# Patient Record
Sex: MALE | Race: WHITE | Employment: OTHER | URBAN - METROPOLITAN AREA
[De-identification: names, ages, dates, MRNs, and addresses within clinical notes are randomized per-mention and may not be internally consistent; named-entity substitution may affect disease eponyms.]

---

## 2018-03-25 ENCOUNTER — HOSPITAL ENCOUNTER (EMERGENCY)
Facility: CLINIC | Age: 83
Discharge: HOME | End: 2018-03-25
Attending: EMERGENCY MEDICINE | Admitting: EMERGENCY MEDICINE
Payer: COMMERCIAL

## 2018-03-25 VITALS
HEART RATE: 60 BPM | TEMPERATURE: 97.9 F | WEIGHT: 183 LBS | BODY MASS INDEX: 25.62 KG/M2 | OXYGEN SATURATION: 100 % | SYSTOLIC BLOOD PRESSURE: 158 MMHG | DIASTOLIC BLOOD PRESSURE: 82 MMHG | HEIGHT: 71 IN

## 2018-03-25 DIAGNOSIS — L02.212 ABSCESS OF BACK: ICD-10-CM

## 2018-03-25 DIAGNOSIS — L03.312 CELLULITIS OF BACK: ICD-10-CM

## 2018-03-25 PROCEDURE — 10060 I&D ABSCESS SIMPLE/SINGLE: CPT

## 2018-03-25 PROCEDURE — 87070 CULTURE OTHR SPECIMN AEROBIC: CPT | Performed by: EMERGENCY MEDICINE

## 2018-03-25 PROCEDURE — 87076 CULTURE ANAEROBE IDENT EACH: CPT | Performed by: EMERGENCY MEDICINE

## 2018-03-25 PROCEDURE — 99283 EMERGENCY DEPT VISIT LOW MDM: CPT | Mod: 25

## 2018-03-25 PROCEDURE — 87077 CULTURE AEROBIC IDENTIFY: CPT | Performed by: EMERGENCY MEDICINE

## 2018-03-25 RX ORDER — SULFAMETHOXAZOLE AND TRIMETHOPRIM 400; 80 MG/1; MG/1
1 TABLET ORAL 2 TIMES DAILY
Qty: 14 TABLET | Refills: 0 | Status: SHIPPED | OUTPATIENT
Start: 2018-03-25 | End: 2018-04-01

## 2018-03-25 RX ORDER — CEPHALEXIN 500 MG/1
500 CAPSULE ORAL 3 TIMES DAILY
Qty: 21 CAPSULE | Refills: 0 | Status: SHIPPED | OUTPATIENT
Start: 2018-03-25 | End: 2018-04-01

## 2018-03-25 ASSESSMENT — ENCOUNTER SYMPTOMS
CHILLS: 0
WOUND: 1
FEVER: 0

## 2018-03-25 NOTE — ED PROVIDER NOTES
"  History     Chief Complaint:  Wound Check     HPI   Joaquim Martinez is an anticoagulated 83 year old male, with a history of atrial fibrillation, who presents with wound check. The patient states that he has a carbuncle on his back, between his shoulders for decades that he notes several providers have evaluated and has not had any difficulties with it in the past. Recently, the patient states that the carbuncle began to hurt and approximately 5 days ago started to drain. He had waited to see if it would resolve on its own, but notes that it began to increase in pain, prompting his ED visit. Here, the patient denies any other symptoms including fever, shaking, or chills. He is not diabetic.     The patient's past medical history and medications were reviewed in CareEverywhere.     Allergies:  Calcium channel blockers    Medications:    Vitamin D  Coumadin  Cozaar  Ambien   Coreg  Lasix  Lupron  Potassium Chloride    Past Medical History:    Cancer  Hypertension  Atrial fibrillation   Cardiomyopathy  PVD  Pacemaker  Carotid bruit  PAD  Hyperlipidemia    Past Surgical History:    The patient does not have any pertinent past surgical history  Family History:    No past pertinent family history.    Social History:  Negative for alcohol use.  Negative for tobacco use.   Marital Status:   [2]     Review of Systems   Constitutional: Negative for chills and fever.   Skin: Positive for wound.   All other systems reviewed and are negative.      Physical Exam   First Vitals:  BP: 158/82  Pulse: 60  Heart Rate: 60  Temp: 97.9  F (36.6  C)  Height: 180.3 cm (5' 11\")  Weight: 83 kg (183 lb)  SpO2: 100 %    Physical Exam  General: Alert and cooperative.   Resp:  Non-labored  Skin:  Midline upper thoracic back w/tender fluctuant area w/surrounding erythema.  Surface appears abraded and when pressure applied, drains purulent bloody material from multiple locations.  Neuro:  Speech is normal and fluent.   MSkel:  Moving all " extremities      Emergency Department Course     Laboratory:  Abscess culture aerobic bacterial: In process     Procedures:    Procedure: Incision and Drainage   LOCATIONS:  Back      ANESTHESIA:  Local field block using Lidocaine 1% with epinephrine     PREPARATION:  Cleansed with Normal Saline and Shur Clens     PROCEDURE:  Area was incised with # 11 Blade (Sharp Point) with a Single Straight incision.  Wound treatment included Purulent Drainage, deloculation, expression of copious material: initially purulent and then caseous.  Packing consisted of wick packing.  Appropriate dressing was applied to cover the area.    Patient Status:  Patient tolerated the procedure well. There were no complications.    Emergency Department Course:  Nursing notes and vitals reviewed.     0809  I performed an exam of the patient as documented above. I had also performed the incision and drainage as noted above. The patient tolerated the procedure well. A sample from the area was obtained and sent to lab for testing.     Findings and plan explained to the Patient. Patient discharged home with instructions regarding supportive care, medications, and reasons to return. The importance of close follow-up was reviewed. The patient was prescribed Keflex and Bactrim.         Impression & Plan      Medical Decision Making:  Joaquim Martinez is a 83 year old male who presents for evaluation of pain and drainage at a site of a carbuncle. On exam, this is clearly infected with purulent drainage and redness surrounding it c/w abscess and cellulitis. I and D was performed with expression of both pus and likely sebaceous material. Based on the extent of drainage, wick was placed. He will be placed on antibiotics for strep and staph coverage and will need close follow up with his PCP in 2 days time to reevaluate the wound.  Given extent of drainage, culture was sent.  Bactrim discussed with pharmacy due to patient being on coumadin but is the most  appropriate choice for coverage of staph.    Diagnosis:    ICD-10-CM   1. Abscess of back L02.212   2. Cellulitis of back L03.312       Disposition:  discharged to home    Discharge Medications:  New Prescriptions    CEPHALEXIN (KEFLEX) 500 MG CAPSULE    Take 1 capsule (500 mg) by mouth 3 times daily for 7 days    SULFAMETHOXAZOLE-TRIMETHOPRIM (BACTRIM) 400-80 MG PER TABLET    Take 1 tablet by mouth 2 times daily for 7 days     Malika MANUEL am serving as a scribe on 3/25/2018 at 8:09 AM to personally document services performed by Theresa Mireles MD based on my observations and the provider's statements to me.      Malika Beckham  3/25/2018    EMERGENCY DEPARTMENT                           Theresa Mireles MD  03/25/18 4957

## 2018-03-25 NOTE — ED AVS SNAPSHOT
Emergency Department    [address] [facility] [address]    Phone:  [phone]    Fax:  [phone]                                       Joaquim Martinez   MRN: [id number]    Department:   Emergency Department   Date of Visit:  3/25/2018           After Visit Summary Signature Page     I have received my discharge instructions, and my questions have been answered. I have discussed any challenges I see with this plan with the nurse or doctor.    ..........................................................................................................................................  Patient/Patient Representative Signature      ..........................................................................................................................................  Patient Representative Print Name and Relationship to Patient    ..................................................               ................................................  Date                                            Time    ..........................................................................................................................................  Reviewed by Signature/Title    ...................................................              ..............................................  Date                                                            Time

## 2018-03-25 NOTE — ED AVS SNAPSHOT
Emergency Department    [address] [facility] [address]    Phone:  [phone]    Fax:  [phone]                                       Joaquim Martinez   MRN: [id number]    Department:   Emergency Department   Date of Visit:  3/25/2018           Patient Information     Date Of Birth          10/6/1934        Your diagnoses for this visit were:     Abscess of back     Cellulitis of back        You were seen by Theresa Mireles MD.      Follow-up Information     Follow up with In clinic.    Why:  2 days to reassess wound and remove packing        Follow up with  Emergency Department.    Specialty:  EMERGENCY MEDICINE    Why:  As needed, If symptoms worsen    Contact information:    [address] [facility] [address] [phone]        Discharge Instructions         Abscess (Incision & Drainage)  An abscess is sometimes called a boil. It happens when bacteria get trapped under the skin and start to grow. Pus forms inside the abscess as the body responds to the bacteria. An abscess can happen with an insect bite, ingrown hair, blocked oil gland, pimple, cyst, or puncture wound.  Your healthcare provider has drained the pus from your abscess. If the abscess pocket was large, your healthcare provider may have put in gauze packing. Your provider will need to remove it on your next visit. He or she may also replace it at that time. You may not need antibiotics to treat a simple abscess, unless the infection is spreading into the skin around the wound (cellulitis).  The wound will take about 1 to 2 weeks to heal, depending on the size of the abscess. Healthy tissue will grow from the bottom and sides of the opening until it seals over.  Home care  These tips can help your wound heal:    The wound may drain for the first 2 days. Cover the wound with a clean dry dressing. Change the dressing if it becomes soaked with blood or pus.    If a gauze packing was placed inside  the abscess pocket, you may be told to remove it yourself. You may do this in the shower. Once the packing is removed, you should wash the area in the shower, or clean the area as directed by your provider. Continue to do this until the skin opening has closed. Make sure you wash your hands after changing the packing or cleaning the wound.    If you were prescribed antibiotics, take them as directed until they are all gone.    You may use acetaminophen or ibuprofen to control pain, unless another pain medicine was prescribed. If you have liver disease or ever had a stomach ulcer, talk with your doctor before using these medicines.  Follow-up care  Follow up with your healthcare provider, or as advised. If a gauze packing was put in your wound, it should be removed in 1 to 2 days. Check your wound every day for any signs that the infection is getting worse. The signs are listed below.  When to seek medical advice  Call your healthcare provider right away if any of these occur:    Increasing redness or swelling    Red streaks in the skin leading away from the wound    Increasing local pain or swelling    Continued pus draining from the wound 2 days after treatment    Fever of 100.4 F (38 C) or higher, or as directed by your healthcare provider    Boil returns when you are at home  Date Last Reviewed: 9/1/2016 2000-2017 [employer]. [address] [place] [address], [place] [address]. All rights reserved. This information is not intended as a substitute for professional medical care. Always follow your healthcare professional's instructions.          24 Hour Appointment Hotline       To make an appointment at any [facility], call [phone] ([phone]). If you don't have a family doctor or clinic, we will help you find one. [facility] are conveniently located to serve the needs of you and your family.             Review of your medicines      START taking        Dose / Directions Last dose  taken    cephALEXin 500 MG capsule   Commonly known as:  KEFLEX   Dose:  500 mg   Quantity:  21 capsule        Take 1 capsule (500 mg) by mouth 3 times daily for 7 days   Refills:  0        sulfamethoxazole-trimethoprim 400-80 MG per tablet   Commonly known as:  BACTRIM   Dose:  1 tablet   Quantity:  14 tablet        Take 1 tablet by mouth 2 times daily for 7 days   Refills:  0                Prescriptions were sent or printed at these locations (2 Prescriptions)                   [id number] - [place], [place] - [address] [facility] [address] [facility]   [address] [facility] [address]    Telephone:  [phone]   Fax:  [phone]   Hours:                  E-Prescribed (2 of 2)         cephALEXin (KEFLEX) 500 MG capsule               sulfamethoxazole-trimethoprim (BACTRIM) 400-80 MG per tablet                Procedures and tests performed during your visit     Abscess Culture Aerobic Bacterial      Orders Needing Specimen Collection     None      Pending Results     Date and Time Order Name Status Description    3/25/2018 0820 Abscess Culture Aerobic Bacterial In process             Pending Culture Results     Date and Time Order Name Status Description    3/25/2018 0820 Abscess Culture Aerobic Bacterial In process             Pending Results Instructions     If you had any lab results that were not finalized at the time of your Discharge, you can call the ED Lab Result RN at [phone]. You will be contacted by this team for any positive Lab results or changes in treatment. The nurses are available 7 days a week from 10A to 6:30P.  You can leave a message 24 hours per day and they will return your call.        Test Results From Your Hospital Stay        3/25/2018  8:25 AM                Clinical Quality Measure: Blood Pressure Screening     Your blood pressure was checked while you were in the emergency department today. The last reading we obtained was  BP: 158/82 . Please read the guidelines below about what these numbers mean  "and what you should do about them.  If your systolic blood pressure (the top number) is less than 120 and your diastolic blood pressure (the bottom number) is less than 80, then your blood pressure is normal. There is nothing more that you need to do about it.  If your systolic blood pressure (the top number) is 120-139 or your diastolic blood pressure (the bottom number) is 80-89, your blood pressure may be higher than it should be. You should have your blood pressure rechecked within a year by a primary care provider.  If your systolic blood pressure (the top number) is 140 or greater or your diastolic blood pressure (the bottom number) is 90 or greater, you may have high blood pressure. High blood pressure is treatable, but if left untreated over time it can put you at risk for heart attack, stroke, or kidney failure. You should have your blood pressure rechecked by a primary care provider within the next 4 weeks.  If your provider in the emergency department today gave you specific instructions to follow-up with your doctor or provider even sooner than that, you should follow that instruction and not wait for up to 4 weeks for your follow-up visit.        Thank you for choosing [facility]       Thank you for choosing [facility] for your care. Our goal is always to provide you with excellent care. Hearing back from our patients is one way we can continue to improve our services. Please take a few minutes to complete the written survey that you may receive in the mail after you visit with us. Thank you!        [employer] Information     [employer] lets you send messages to your doctor, view your test results, renew your prescriptions, schedule appointments and more. To sign up, go to [email or web address] . Click on \"Log in\" on the left side of the screen, which will take you to the Welcome page. Then click on \"Sign up Now\" on the right side of the page.     You will be asked to enter the access code listed below, as " well as some personal information. Please follow the directions to create your username and password.     Your access code is: [email or web address]  Expires: 2018  8:42 AM     Your access code will  in 90 days. If you need help or a new code, please call your [facility] clinic or [phone].        Care EveryWhere ID     This is your Care EveryWhere ID. This could be used by other organizations to access your [facility] medical records  [id number]        Equal Access to Services     BRENDA BARKLEY : [place] wright Sostan, wawisam lukamrynadaha, qalinda kaalmavince ni, kartik black . So [facility] [phone].    ATENCIÓN: Si habla español, tiene a mckeon disposición servicios gratuitos de asistencia lingüística. Llame al [phone].    We comply with applicable federal civil rights laws and [place] laws. We do not discriminate on the basis of race, color, national origin, age, disability, sex, sexual orientation, or gender identity.            After Visit Summary       This is your record. Keep this with you and show to your community pharmacist(s) and doctor(s) at your next visit.

## 2018-03-25 NOTE — DISCHARGE INSTRUCTIONS
Abscess (Incision & Drainage)  An abscess is sometimes called a boil. It happens when bacteria get trapped under the skin and start to grow. Pus forms inside the abscess as the body responds to the bacteria. An abscess can happen with an insect bite, ingrown hair, blocked oil gland, pimple, cyst, or puncture wound.  Your healthcare provider has drained the pus from your abscess. If the abscess pocket was large, your healthcare provider may have put in gauze packing. Your provider will need to remove it on your next visit. He or she may also replace it at that time. You may not need antibiotics to treat a simple abscess, unless the infection is spreading into the skin around the wound (cellulitis).  The wound will take about 1 to 2 weeks to heal, depending on the size of the abscess. Healthy tissue will grow from the bottom and sides of the opening until it seals over.  Home care  These tips can help your wound heal:    The wound may drain for the first 2 days. Cover the wound with a clean dry dressing. Change the dressing if it becomes soaked with blood or pus.    If a gauze packing was placed inside the abscess pocket, you may be told to remove it yourself. You may do this in the shower. Once the packing is removed, you should wash the area in the shower, or clean the area as directed by your provider. Continue to do this until the skin opening has closed. Make sure you wash your hands after changing the packing or cleaning the wound.    If you were prescribed antibiotics, take them as directed until they are all gone.    You may use acetaminophen or ibuprofen to control pain, unless another pain medicine was prescribed. If you have liver disease or ever had a stomach ulcer, talk with your doctor before using these medicines.  Follow-up care  Follow up with your healthcare provider, or as advised. If a gauze packing was put in your wound, it should be removed in 1 to 2 days. Check your wound every day for any  signs that the infection is getting worse. The signs are listed below.  When to seek medical advice  Call your healthcare provider right away if any of these occur:    Increasing redness or swelling    Red streaks in the skin leading away from the wound    Increasing local pain or swelling    Continued pus draining from the wound 2 days after treatment    Fever of 100.4 F (38 C) or higher, or as directed by your healthcare provider    Boil returns when you are at home  Date Last Reviewed: 9/1/2016 2000-2017 [employer]. [address] [place], [place] [address]. All rights reserved. This information is not intended as a substitute for professional medical care. Always follow your healthcare professional's instructions.

## 2018-03-29 ENCOUNTER — TELEPHONE (OUTPATIENT)
Dept: EMERGENCY MEDICINE | Facility: CLINIC | Age: 83
End: 2018-03-29

## 2018-03-29 LAB
BACTERIA SPEC CULT: ABNORMAL
BACTERIA SPEC CULT: ABNORMAL
Lab: ABNORMAL
SPECIMEN SOURCE: ABNORMAL

## 2018-03-29 NOTE — TELEPHONE ENCOUNTER
[facility] Emergency Department Lab result notification:    [facility] ED lab result protocol used  Urine Culture    Reason for call  Notify of lab results, assess symptoms,  review ED providers recommendations/discharge instructions (if necessary) and advise per ED lab result f/u protocol    Lab Result  Final Abscess Culture Report on 3/29/18  [facility] ED discharge antibiotic's: Cephalexin (Keflex) 500 mg capsule, 1 capsule (500 mg) by mouth 3 times daily for 7 days and Sulfamethoxazole-Trimethoprim (Bactrim DS, Septra DS) 800-160 mg PO tablet, 1 tablet by mouth 2 times daily for 7 days.  Bacteria #1: On day 2, isolated in broth only:Coagulase negative Staphylococcus is [NOT TESTED] to ED discharge antibiotic  Bacteria #2: On day 2, isolated in broth only: Finegoldia magna (Peptostreptococcus adrien) is [NOT TESTED] to ED discharge antibiotic  Incision and Drainage performed in [facility] ED [Yes or No]: YES           Recommendations in treatment per  ED Lab result protocol  Information table from ED Provider visit on 3/25/18  Symptoms reported at ED visit (Chief complaint, HPI) History      Chief Complaint:  Wound Check      HPI   Joaquim Martinez is an anticoagulated 83 year old male, with a history of atrial fibrillation, who presents with wound check. The patient states that he has a carbuncle on his back, between his shoulders for decades that he notes several providers have evaluated and has not had any difficulties with it in the past. Recently, the patient states that the carbuncle began to hurt and approximately 5 days ago started to drain. He had waited to see if it would resolve on its own, but notes that it began to increase in pain, prompting his ED visit. Here, the patient denies any other symptoms including fever, shaking, or chills. He is not diabetic.       ED providers Impression and Plan (applicable information) Medical Decision Making:  Joaquim Martinez is a 83 year old male who presents for  "evaluation of pain and drainage at a site of a carbuncle. On exam, this is clearly infected with purulent drainage and redness surrounding it c/w abscess and cellulitis. I and D was performed with expression of both pus and likely sebaceous material. Based on the extent of drainage, wick was placed. He will be placed on antibiotics for strep and staph coverage and will need close follow up with his PCP in 2 days time to reevaluate the wound.  Given extent of drainage, culture was sent.  Bactrim discussed with pharmacy due to patient being on coumadin but is the most appropriate choice for coverage of staph.     Diagnosis:      ICD-10-CM   1. Abscess of back L02.212   2. Cellulitis of back L03.312      Discharge Medications:       New Prescriptions     CEPHALEXIN (KEFLEX) 500 MG CAPSULE    Take 1 capsule (500 mg) by mouth 3 times daily for 7 days     SULFAMETHOXAZOLE-TRIMETHOPRIM (BACTRIM) 400-80 MG PER TABLET    Take 1 tablet by mouth 2 times daily for 7 days      Miscellaneous information Theresa Mireles MD  03/25/18 1742     RN Assessment (Patient s current Symptoms), include time called.  [Insert Left message here if message left]  1:27 pm Pt states, \"I am doing very well.\" Has already seen his PCP.    RN Recommendations/Instructions per [facility] ED lab result protocol  Advised to finish full course of antibiotics as prescribed and drink plenty of fluids.  And follow up with your PCP as the ED provider recommended.     Please Contact your PCP clinic or return to the Emergency department if your:    Symptoms return.    Symptoms do not improve after 3 days on antibiotic.    Symptoms do not resolve after completing antibiotic.    Symptoms worsen or other concerning symptom's.    PCP follow-up Questions asked: YES       Yaa Heller RN  [facility] RN  Lung Nodule and ED Lab Result F/u RN  Epic pool (ED late result f/u RN): [phone] [id number]  Ph# [phone].      "

## 2018-07-27 ENCOUNTER — HOSPITAL ENCOUNTER (EMERGENCY)
Facility: CLINIC | Age: 83
Discharge: HOME | End: 2018-07-27
Attending: EMERGENCY MEDICINE | Admitting: EMERGENCY MEDICINE
Payer: COMMERCIAL

## 2018-07-27 VITALS
HEART RATE: 65 BPM | WEIGHT: 181 LBS | OXYGEN SATURATION: 100 % | RESPIRATION RATE: 18 BRPM | BODY MASS INDEX: 25.24 KG/M2 | SYSTOLIC BLOOD PRESSURE: 156 MMHG | TEMPERATURE: 98 F | DIASTOLIC BLOOD PRESSURE: 64 MMHG

## 2018-07-27 DIAGNOSIS — K08.409 S/P TOOTH EXTRACTION: ICD-10-CM

## 2018-07-27 LAB
BASOPHILS # BLD AUTO: 0 10E9/L (ref 0–0.2)
BASOPHILS NFR BLD AUTO: 0.3 %
DIFFERENTIAL METHOD BLD: ABNORMAL
EOSINOPHIL # BLD AUTO: 0 10E9/L (ref 0–0.7)
EOSINOPHIL NFR BLD AUTO: 1 %
ERYTHROCYTE [DISTWIDTH] IN BLOOD BY AUTOMATED COUNT: 13 % (ref 10–15)
HCT VFR BLD AUTO: 33.1 % (ref 40–53)
HGB BLD-MCNC: 11.5 G/DL (ref 13.3–17.7)
IMM GRANULOCYTES # BLD: 0 10E9/L (ref 0–0.4)
IMM GRANULOCYTES NFR BLD: 0 %
INR PPP: 3.27 (ref 0.86–1.14)
LYMPHOCYTES # BLD AUTO: 0.7 10E9/L (ref 0.8–5.3)
LYMPHOCYTES NFR BLD AUTO: 23.2 %
MCH RBC QN AUTO: 33.1 PG (ref 26.5–33)
MCHC RBC AUTO-ENTMCNC: 34.7 G/DL (ref 31.5–36.5)
MCV RBC AUTO: 95 FL (ref 78–100)
MONOCYTES # BLD AUTO: 0.4 10E9/L (ref 0–1.3)
MONOCYTES NFR BLD AUTO: 13.7 %
NEUTROPHILS # BLD AUTO: 2 10E9/L (ref 1.6–8.3)
NEUTROPHILS NFR BLD AUTO: 61.8 %
NRBC # BLD AUTO: 0 10*3/UL
NRBC BLD AUTO-RTO: 0 /100
PLATELET # BLD AUTO: 110 10E9/L (ref 150–450)
RBC # BLD AUTO: 3.47 10E12/L (ref 4.4–5.9)
WBC # BLD AUTO: 3.2 10E9/L (ref 4–11)

## 2018-07-27 PROCEDURE — 85610 PROTHROMBIN TIME: CPT | Performed by: EMERGENCY MEDICINE

## 2018-07-27 PROCEDURE — 85025 COMPLETE CBC W/AUTO DIFF WBC: CPT | Performed by: EMERGENCY MEDICINE

## 2018-07-27 PROCEDURE — 99283 EMERGENCY DEPT VISIT LOW MDM: CPT

## 2018-07-27 RX ORDER — TRANEXAMIC ACID 100 MG/ML
500 INJECTION, SOLUTION INTRAVENOUS ONCE
Status: DISCONTINUED | OUTPATIENT
Start: 2018-07-27 | End: 2018-07-27 | Stop reason: HOSPADM

## 2018-07-27 ASSESSMENT — ENCOUNTER SYMPTOMS: WOUND: 1

## 2018-07-27 NOTE — ED AVS SNAPSHOT
Emergency Department    [address] [facility] [address]    Phone:  [phone]    Fax:  [phone]                                       Joaquim Martinez   MRN: [id number]    Department:   Emergency Department   Date of Visit:  7/27/2018           Patient Information     Date Of Birth          10/6/1934        Your diagnoses for this visit were:     S/P tooth extraction with bleeding        You were seen by Collins Chung MD.      Follow-up Information     Call to follow up.    Why:  your dentist, As needed        Follow up with  Emergency Department.    Specialty:  EMERGENCY MEDICINE    Why:  If symptoms worsen    Contact information:    [address] [facility] [address] [phone]      Discharge References/Attachments     TOOTH EXTRACTION, AFTER: CARING FOR YOUR MOUTH (ENGLISH)    POST OP WOUND CHECK, BLEEDING (ENGLISH)      24 Hour Appointment Hotline       To make an appointment at any [facility], call [phone] ([phone]). If you don't have a family doctor or clinic, we will help you find one. [facility] are conveniently located to serve the needs of you and your family.             Review of your medicines      Our records show that you are taking the medicines listed below. If these are incorrect, please call your family doctor or clinic.        Dose / Directions Last dose taken    calcium-vitamin D 600-400 MG-UNIT per tablet   Commonly known as:  CALTRATE   Dose:  1 tablet        Take 1 tablet by mouth 2 times daily   Refills:  0        COREG CR PO        Refills:  0        COZAAR PO        Refills:  0        LASIX PO        Refills:  0        potassium chloride rowena er   Commonly known as:  K-DUR        Take by mouth 2 times daily   Refills:  0        WARFARIN SODIUM PO        Refills:  0                Procedures and tests performed during your visit     CBC with platelets differential    INR      Orders Needing Specimen Collection      None      Pending Results     No orders found from 7/25/2018 to 7/28/2018.            Pending Culture Results     No orders found from 7/25/2018 to 7/28/2018.            Pending Results Instructions     If you had any lab results that were not finalized at the time of your Discharge, you can call the ED Lab Result RN at [phone]. You will be contacted by this team for any positive Lab results or changes in treatment. The nurses are available 7 days a week from 10A to 6:30P.  You can leave a message 24 hours per day and they will return your call.        Test Results From Your Hospital Stay        7/27/2018  1:25 PM      Component Results     Component Value Ref Range & Units Status    WBC 3.2 (L) 4.0 - 11.0 10e9/L Final    RBC Count 3.47 (L) 4.4 - 5.9 10e12/L Final    Hemoglobin 11.5 (L) 13.3 - 17.7 g/dL Final    Hematocrit 33.1 (L) 40.0 - 53.0 % Final    MCV 95 78 - 100 fl Final    MCH 33.1 (H) 26.5 - 33.0 pg Final    MCHC 34.7 31.5 - 36.5 g/dL Final    RDW 13.0 10.0 - 15.0 % Final    Platelet Count 110 (L) 150 - 450 10e9/L Final    Diff Method Automated Method  Final    % Neutrophils 61.8 % Final    % Lymphocytes 23.2 % Final    % Monocytes 13.7 % Final    % Eosinophils 1.0 % Final    % Basophils 0.3 % Final    % Immature Granulocytes 0.0 % Final    Nucleated RBCs 0 0 /100 Final    Absolute Neutrophil 2.0 1.6 - 8.3 10e9/L Final    Absolute Lymphocytes 0.7 (L) 0.8 - 5.3 10e9/L Final    Absolute Monocytes 0.4 0.0 - 1.3 10e9/L Final    Absolute Eosinophils 0.0 0.0 - 0.7 10e9/L Final    Absolute Basophils 0.0 0.0 - 0.2 10e9/L Final    Abs Immature Granulocytes 0.0 0 - 0.4 10e9/L Final    Absolute Nucleated RBC 0.0  Final         7/27/2018  1:35 PM      Component Results     Component Value Ref Range & Units Status    INR 3.27 (H) 0.86 - 1.14 Final                Clinical Quality Measure: Blood Pressure Screening     Your blood pressure was checked while you were in the emergency department today. The last  "reading we obtained was  BP: 143/63 . Please read the guidelines below about what these numbers mean and what you should do about them.  If your systolic blood pressure (the top number) is less than 120 and your diastolic blood pressure (the bottom number) is less than 80, then your blood pressure is normal. There is nothing more that you need to do about it.  If your systolic blood pressure (the top number) is 120-139 or your diastolic blood pressure (the bottom number) is 80-89, your blood pressure may be higher than it should be. You should have your blood pressure rechecked within a year by a primary care provider.  If your systolic blood pressure (the top number) is 140 or greater or your diastolic blood pressure (the bottom number) is 90 or greater, you may have high blood pressure. High blood pressure is treatable, but if left untreated over time it can put you at risk for heart attack, stroke, or kidney failure. You should have your blood pressure rechecked by a primary care provider within the next 4 weeks.  If your provider in the emergency department today gave you specific instructions to follow-up with your doctor or provider even sooner than that, you should follow that instruction and not wait for up to 4 weeks for your follow-up visit.        Thank you for choosing [facility]       Thank you for choosing [facility] for your care. Our goal is always to provide you with excellent care. Hearing back from our patients is one way we can continue to improve our services. Please take a few minutes to complete the written survey that you may receive in the mail after you visit with us. Thank you!        [facility] Information     [employer] lets you send messages to your doctor, view your test results, renew your prescriptions, schedule appointments and more. To sign up, go to [email or web address] . Click on \"Log in\" on the left side of the screen, which will take you to the Welcome page. Then click on \"Sign up " "Now\" on the right side of the page.     You will be asked to enter the access code listed below, as well as some personal information. Please follow the directions to create your username and password.     Your access code is: [email or web address]  Expires: 10/25/2018  2:39 PM     Your access code will  in 90 days. If you need help or a new code, please call your [facility] clinic or [phone].        Care EveryWhere ID     This is your Care EveryWhere ID. This could be used by other organizations to access your [facility] medical records  [id number]        Equal Access to Services     [facility] : [place] Pierre, karime romero, bertrand ni, kartik black . So [facility] [phone].    ATENCIÓN: Si habla español, tiene a mckeon disposición servicios gratuitos de asistencia lingüística. Llame al [phone].    We comply with applicable federal civil rights laws and [place] laws. We do not discriminate on the basis of race, color, national origin, age, disability, sex, sexual orientation, or gender identity.            After Visit Summary       This is your record. Keep this with you and show to your community pharmacist(s) and doctor(s) at your next visit.                  "

## 2018-07-27 NOTE — ED PROVIDER NOTES
"  History     Chief Complaint:  Dental problem      HPI   Joaquim Martinez is a 83 year old male on Coumadin for a-fib who presents with bleeding from his tooth extraction site. The patient states that 48 hours ago he had a tooth extracted and has been bleeding ever since. Sometimes the blood drips out and sometimes it \"gushes\" out. He had his INR measured today at 3.9 at [facility]. He denies any other symptoms. He was not told to stop his Coumadin prior to the extraction.       Allergies:  Calcium channel blockers     Medications:    Coreg  Lasix  Cozaar  K-dur  Warfarin    Past Medical History:    Atrial fibrillation  Cancer  Hypertension  Pacemaker     Past Surgical History:    Pacemaker placement  Tonsillectomy    Family History:    No past pertinent family history.    Social History:  Patient presents with wife  Negative for tobacco use.  Positive for alcohol use.   Marital Status:        Review of Systems   HENT: Positive for dental problem (Bleed from dental extraction).    Skin: Positive for wound.   All other systems reviewed and are negative.      Physical Exam   First Vitals:  BP: 143/63  Heart Rate: 60  Temp: 98  F (36.7  C)  Resp: 14  Weight: 82.1 kg (181 lb)  SpO2: 100 %      Physical Exam  Nursing note and vitals reviewed.  Constitutional:  Oriented to person, place, and time. Cooperative.   HENT:   Nose:    Nose normal.   Mouth/Throat:   Mucous membranes are normal. Tooth #6 with postextraction changes present and small amount of oozing of blood.  Eyes:    Conjunctivae normal and EOM are normal.      Pupils are equal, round, and reactive to light.   Neck:    Trachea normal.   Cardiovascular:  Normal rate, regular rhythm, normal heart sounds and normal pulses. No murmur heard.  Pulmonary/Chest:  Effort normal and breath sounds normal.   Abdominal:   Soft. Normal appearance and bowel sounds are normal.      There is no tenderness.      There is no rebound and no CVA tenderness. "   Musculoskeletal:  Extremities atraumatic x 4.   Lymphadenopathy:  No cervical adenopathy.   Neurological:   Alert and oriented to person, place, and time. Normal strength.      No cranial nerve deficit or sensory deficit. GCS eye subscore is 4. GCS verbal subscore is 5. GCS motor subscore is 6.   Skin:    Skin is intact. No rash noted.   Psychiatric:   Normal mood and affect.      Emergency Department Course   Laboratory:  CBC: WBC: 3.2 (L), HGB: 11.5 (L), PLT: 110 (L)  INR: 3.27    Interventions:  Cyklokapron 500 mg topical      Emergency Department Course:  Nursing notes and vitals reviewed.  1220: I performed an exam of the patient as documented above. Labs ordered.     1400: I rechecked the patient and applied Cyklokapron to the mouth.    1433: I rechecked the patient. Findings and plan explained to the Patient. Patient discharged home with instructions regarding supportive care, medications, and reasons to return. The importance of close follow-up was reviewed.       Impression & Plan    Medical Decision Making:  This is an 83-year-old male who came in with bleeding from where he had a tooth removed 2 days ago.  Upon arrival here, he was not bleeding very much and only oozing blood.  I felt it was reasonable to check a CBC and his INR.  I also placed gauze soaked in tranexamic acid in the socket for quite some time.  We then removed it and he did not have any recurrent bleeding here.  He is instructed not to take his Coumadin for a couple of days.  I will send him home with some oral gauze as well.  If he has recurrent bleeding, he is to place 1 of the gauze packs and return here if his bleeding does not stop after about 20 minutes.  He should follow-up with his dentist as well.      Diagnosis:    ICD-10-CM    1. S/P tooth extraction with bleeding K08.409        MAR, Jc Lynne, am serving as a scribe on 7/27/2018 at 12:20 PM to personally document services performed by Collins Chung MD based on my  observations and the provider's statements to me.       Jc Lynne  7/27/2018    EMERGENCY DEPARTMENT       Collins Chung MD  07/27/18 [id number]

## 2018-08-02 ENCOUNTER — HOSPITAL ENCOUNTER (EMERGENCY)
Facility: CLINIC | Age: 83
Discharge: HOME | End: 2018-08-02
Attending: EMERGENCY MEDICINE | Admitting: EMERGENCY MEDICINE
Payer: COMMERCIAL

## 2018-08-02 VITALS
BODY MASS INDEX: 25.2 KG/M2 | TEMPERATURE: 98.5 F | RESPIRATION RATE: 16 BRPM | OXYGEN SATURATION: 100 % | SYSTOLIC BLOOD PRESSURE: 155 MMHG | WEIGHT: 180 LBS | HEIGHT: 71 IN | DIASTOLIC BLOOD PRESSURE: 77 MMHG

## 2018-08-02 DIAGNOSIS — Z98.818 SURGICAL WOUND HEMORRHAGE AFTER DENTAL PROCEDURE: ICD-10-CM

## 2018-08-02 DIAGNOSIS — K91.840 SURGICAL WOUND HEMORRHAGE AFTER DENTAL PROCEDURE: ICD-10-CM

## 2018-08-02 PROCEDURE — D9930 ZZHC DENTAL TREATMENT COMPLICATION POSTSURGICAL DENTAL COMPLICATION: HCPCS

## 2018-08-02 PROCEDURE — 99283 EMERGENCY DEPT VISIT LOW MDM: CPT | Mod: 25

## 2018-08-02 ASSESSMENT — ENCOUNTER SYMPTOMS
BRUISES/BLEEDS EASILY: 1
DIZZINESS: 0
LIGHT-HEADEDNESS: 0

## 2018-08-02 NOTE — DISCHARGE INSTRUCTIONS
Wound Check After Surgery: Bleeding  Surgery involves cutting through layers of skin, fatty tissue, muscle, and sometimes bone and cartilage. Sutures (stitches) or staples are used to close all layers of the wound. The sutures on the inside will dissolve in about 2 to 3 weeks. Any sutures or staples used on the outside need to be removed in about 7 to 14 days, depending on the location.  It is normal to have some clear or bloody discharge on the wound covering or bandage (dressing) for the first few days after surgery. If your wound was sutured (sewn) closed, you should not have to change the dressing more than three times a day in the first few days. Bleeding or discharge requiring more frequent dressing changes can be a sign of a problem.  Home care  Different types of surgery require different types of care and dressing changes. It is important to follow all instructions and advice from your surgeon, as well as other members of your healthcare team.  Wound care    Keep the wound clean, as directed by your healthcare provider.    Change the dressing as directed. Change the dressing sooner if it becomes wet or stained with blood or fluid from the wound.    Bathe with a sponge (no shower or tub baths) for the first few days after surgery, or until there is no more drainage from the wound. Unless you received different instructions from your surgeon, you can then shower. Don't soak the area in water (no baths or swimming) until the tape, sutures, or staples are removed and any wound opening has dried out and healed.  Changing the dressing    Wash your hands before changing the dressings.    Carefully remove the dressing and tape; don t just yank it off. If it sticks to the wound, you may need to wet it a little to remove it, unless your healthcare provider told you not to wet it.    Wash your hands again before putting on a new, clean dressing.    Gently clean the wound with clean water (or saline) using gauze or a  clean washcloth. Do not rub it or pick at it.    Do not use soap, alcohol, hydrogen peroxide, or any other cleanser.    If you were told to dry the wound before putting on a new dressing, gently pat it dry. Do not rub.    Throw out the old dressing. Do not reuse it!    Wash your hands again when you are done.  Types of dressings  Your healthcare team will tell you what type of dressing to put on your wound. Follow your healthcare team s instructions carefully, and contact them if you have any questions. Two common types of dressings are described below. You may have one of these or another type.    Dry dressing. Use dry gauze. If the wound is still draining, use a  nonadherent  dressing, which shouldn t stick to the wound.    Wet-to-dry dressing. Wet the gauze, and squeeze out the excess water (or saline), before putting it on. Then, cover this with a dry pad.  Medicines    If you were given antibiotics, take them until they are used up or your healthcare provider tells you to stop. It is important to finish the antibiotics even though you feel better, to make sure the infection has cleared.    You can take acetaminophen or ibuprofen for pain, unless you were given a different pain medicine to use. (Note: If you have chronic liver or kidney disease, or have ever had a stomach ulcer or gastrointestinal bleeding, or are taking blood thinner medicines, talk with your healthcare provider before using these medicines.)    Aspirin should never be used in anyone under 18 years of age who is ill with a fever. It may cause severe liver damage.  Follow-up care  Follow up with your healthcare provider, or as advised, for your next wound check or removal of sutures, staples, or tape.    If a culture was done, you will be notified if the results will affect your treatment. You can call as directed for the results.    If imaging tests, such as X-rays, an ultrasound, or CT scan were done, they will be reviewed by a specialist. You  will be notified of the results, especially if they affect treatment.  Call 911  Call 911 if any of these occur:    Trouble breathing or swallowing, wheezing    Hoarse voice or trouble speaking    Extreme confusion    Extreme drowsiness or trouble awakening    Fainting or loss of consciousness    Rapid heart rate or very slow heart rate    Vomiting blood, or large amounts of blood in stool    Discomfort in the center of the chest that feels like pressure, squeezing, a sense of fullness, or pain    Discomfort or pain in other upper body areas, such as the back, one or both arms, neck, jaw, or stomach    Stroke symptoms (spot a stroke  FAST )  ? F: Face drooping. One side of the face is numb or droops.  ? A: Arm weakness. One arm feels weak or numb.  ? S: Speech difficulty: Speech is slurred, or the person is unable to speak.  ? T: Time to call 911. Even if symptoms go away, call 911.  When to seek medical advice  Call your healthcare provider right away if any of the following occur:    Fluid or blood soaking 5 or more bandages a day during the first 3 days after surgery    Fluid or blood still draining from the wound more than 3 days after surgery    Increasing pain at the site of surgery    Fever of 100.4  F (38  C) or higher, or as directed by your healthcare provider    Redness around the wound    Pus coming from the wound    Vomiting, constipation, or diarrhea  Date Last Reviewed: 9/27/2015 2000-2017 [employer]. [address] [place] [address], [place] [address]. All rights reserved. This information is not intended as a substitute for professional medical care. Always follow your healthcare professional's instructions.

## 2018-08-02 NOTE — ED AVS SNAPSHOT
Emergency Department    [address] [facility] [address]    Phone:  [phone]    Fax:  [phone]                                       Joaquim Martinez   MRN: [id number]    Department:   Emergency Department   Date of Visit:  8/2/2018           After Visit Summary Signature Page     I have received my discharge instructions, and my questions have been answered. I have discussed any challenges I see with this plan with the nurse or doctor.    ..........................................................................................................................................  Patient/Patient Representative Signature      ..........................................................................................................................................  Patient Representative Print Name and Relationship to Patient    ..................................................               ................................................  Date                                            Time    ..........................................................................................................................................  Reviewed by Signature/Title    ...................................................              ..............................................  Date                                                            Time

## 2018-08-02 NOTE — ED AVS SNAPSHOT
Emergency Department    [address] [place] [address]    Phone:  [phone]    Fax:  [phone]                                       Joaquim Martinez   MRN: [id number]    Department:   Emergency Department   Date of Visit:  8/2/2018           Patient Information     Date Of Birth          10/6/1934        Your diagnoses for this visit were:     Surgical wound hemorrhage after dental procedure        You were seen by Maxim Kate MD.      Follow-up Information     Please follow up.    Why:  with your dentist today        Discharge Instructions         Wound Check After Surgery: Bleeding  Surgery involves cutting through layers of skin, fatty tissue, muscle, and sometimes bone and cartilage. Sutures (stitches) or staples are used to close all layers of the wound. The sutures on the inside will dissolve in about 2 to 3 weeks. Any sutures or staples used on the outside need to be removed in about 7 to 14 days, depending on the location.  It is normal to have some clear or bloody discharge on the wound covering or bandage (dressing) for the first few days after surgery. If your wound was sutured (sewn) closed, you should not have to change the dressing more than three times a day in the first few days. Bleeding or discharge requiring more frequent dressing changes can be a sign of a problem.  Home care  Different types of surgery require different types of care and dressing changes. It is important to follow all instructions and advice from your surgeon, as well as other members of your healthcare team.  Wound care    Keep the wound clean, as directed by your healthcare provider.    Change the dressing as directed. Change the dressing sooner if it becomes wet or stained with blood or fluid from the wound.    Bathe with a sponge (no shower or tub baths) for the first few days after surgery, or until there is no more drainage from the wound. Unless you received different instructions from  your surgeon, you can then shower. Don't soak the area in water (no baths or swimming) until the tape, sutures, or staples are removed and any wound opening has dried out and healed.  Changing the dressing    Wash your hands before changing the dressings.    Carefully remove the dressing and tape; don t just yank it off. If it sticks to the wound, you may need to wet it a little to remove it, unless your healthcare provider told you not to wet it.    Wash your hands again before putting on a new, clean dressing.    Gently clean the wound with clean water (or saline) using gauze or a clean washcloth. Do not rub it or pick at it.    Do not use soap, alcohol, hydrogen peroxide, or any other cleanser.    If you were told to dry the wound before putting on a new dressing, gently pat it dry. Do not rub.    Throw out the old dressing. Do not reuse it!    Wash your hands again when you are done.  Types of dressings  Your healthcare team will tell you what type of dressing to put on your wound. Follow your healthcare team s instructions carefully, and contact them if you have any questions. Two common types of dressings are described below. You may have one of these or another type.    Dry dressing. Use dry gauze. If the wound is still draining, use a  nonadherent  dressing, which shouldn t stick to the wound.    Wet-to-dry dressing. Wet the gauze, and squeeze out the excess water (or saline), before putting it on. Then, cover this with a dry pad.  Medicines    If you were given antibiotics, take them until they are used up or your healthcare provider tells you to stop. It is important to finish the antibiotics even though you feel better, to make sure the infection has cleared.    You can take acetaminophen or ibuprofen for pain, unless you were given a different pain medicine to use. (Note: If you have chronic liver or kidney disease, or have ever had a stomach ulcer or gastrointestinal bleeding, or are taking blood  thinner medicines, talk with your healthcare provider before using these medicines.)    Aspirin should never be used in anyone under 18 years of age who is ill with a fever. It may cause severe liver damage.  Follow-up care  Follow up with your healthcare provider, or as advised, for your next wound check or removal of sutures, staples, or tape.    If a culture was done, you will be notified if the results will affect your treatment. You can call as directed for the results.    If imaging tests, such as X-rays, an ultrasound, or CT scan were done, they will be reviewed by a specialist. You will be notified of the results, especially if they affect treatment.  Call 911  Call 911 if any of these occur:    Trouble breathing or swallowing, wheezing    Hoarse voice or trouble speaking    Extreme confusion    Extreme drowsiness or trouble awakening    Fainting or loss of consciousness    Rapid heart rate or very slow heart rate    Vomiting blood, or large amounts of blood in stool    Discomfort in the center of the chest that feels like pressure, squeezing, a sense of fullness, or pain    Discomfort or pain in other upper body areas, such as the back, one or both arms, neck, jaw, or stomach    Stroke symptoms (spot a stroke  FAST )  ? F: Face drooping. One side of the face is numb or droops.  ? A: Arm weakness. One arm feels weak or numb.  ? S: Speech difficulty: Speech is slurred, or the person is unable to speak.  ? T: Time to call 911. Even if symptoms go away, call 911.  When to seek medical advice  Call your healthcare provider right away if any of the following occur:    Fluid or blood soaking 5 or more bandages a day during the first 3 days after surgery    Fluid or blood still draining from the wound more than 3 days after surgery    Increasing pain at the site of surgery    Fever of 100.4  F (38  C) or higher, or as directed by your healthcare provider    Redness around the wound    Pus coming from the  wound    Vomiting, constipation, or diarrhea  Date Last Reviewed: 9/27/2015 2000-2017 [employer]. [address] [place] [address], [place] [address]. All rights reserved. This information is not intended as a substitute for professional medical care. Always follow your healthcare professional's instructions.          24 Hour Appointment Hotline       To make an appointment at any [facility], call [phone] ([phone]). If you don't have a family doctor or clinic, we will help you find one. [facility] are conveniently located to serve the needs of you and your family.             Review of your medicines      Our records show that you are taking the medicines listed below. If these are incorrect, please call your family doctor or clinic.        Dose / Directions Last dose taken    calcium-vitamin D 600-400 MG-UNIT per tablet   Commonly known as:  CALTRATE   Dose:  1 tablet        Take 1 tablet by mouth 2 times daily   Refills:  0        COREG CR PO        Refills:  0        COZAAR PO        Refills:  0        LASIX PO        Refills:  0        potassium chloride rowena er   Commonly known as:  K-DUR        Take by mouth 2 times daily   Refills:  0        WARFARIN SODIUM PO        Refills:  0                Orders Needing Specimen Collection     None      Pending Results     No orders found from 7/31/2018 to 8/3/2018.            Pending Culture Results     No orders found from 7/31/2018 to 8/3/2018.            Pending Results Instructions     If you had any lab results that were not finalized at the time of your Discharge, you can call the ED Lab Result RN at [phone]. You will be contacted by this team for any positive Lab results or changes in treatment. The nurses are available 7 days a week from 10A to 6:30P.  You can leave a message 24 hours per day and they will return your call.        Test Results From Your Hospital Stay               Clinical Quality Measure: Blood Pressure  Screening     Your blood pressure was checked while you were in the emergency department today. The last reading we obtained was  BP: 155/77 . Please read the guidelines below about what these numbers mean and what you should do about them.  If your systolic blood pressure (the top number) is less than 120 and your diastolic blood pressure (the bottom number) is less than 80, then your blood pressure is normal. There is nothing more that you need to do about it.  If your systolic blood pressure (the top number) is 120-139 or your diastolic blood pressure (the bottom number) is 80-89, your blood pressure may be higher than it should be. You should have your blood pressure rechecked within a year by a primary care provider.  If your systolic blood pressure (the top number) is 140 or greater or your diastolic blood pressure (the bottom number) is 90 or greater, you may have high blood pressure. High blood pressure is treatable, but if left untreated over time it can put you at risk for heart attack, stroke, or kidney failure. You should have your blood pressure rechecked by a primary care provider within the next 4 weeks.  If your provider in the emergency department today gave you specific instructions to follow-up with your doctor or provider even sooner than that, you should follow that instruction and not wait for up to 4 weeks for your follow-up visit.        Thank you for choosing [facility]       Thank you for choosing [facility] for your care. Our goal is always to provide you with excellent care. Hearing back from our patients is one way we can continue to improve our services. Please take a few minutes to complete the written survey that you may receive in the mail after you visit with us. Thank you!        [employer] Information     [employer] lets you send messages to your doctor, view your test results, renew your prescriptions, schedule appointments and more. To sign up, go to [email or web address] . Click on  "\"Log in\" on the left side of the screen, which will take you to the Welcome page. Then click on \"Sign up Now\" on the right side of the page.     You will be asked to enter the access code listed below, as well as some personal information. Please follow the directions to create your username and password.     Your access code is: [email or web address]  Expires: 10/25/2018  2:39 PM     Your access code will  in 90 days. If you need help or a new code, please call your [place] clinic or [phone].        Care EveryWhere ID     This is your Care EveryWhere ID. This could be used by other organizations to access your [place] medical records  [id number]        Equal Access to Services     BRENDA BARKLEY : Chico Pierre, wawisam romero, qalinda kaalrc ni, kartik golden. So [facility] [phone].    ATENCIÓN: Si habla español, tiene a mckeon disposición servicios gratuitos de asistencia lingüística. Llame al [phone].    We comply with applicable federal civil rights laws and [place] laws. We do not discriminate on the basis of race, color, national origin, age, disability, sex, sexual orientation, or gender identity.            After Visit Summary       This is your record. Keep this with you and show to your community pharmacist(s) and doctor(s) at your next visit.                  "

## 2018-08-02 NOTE — ED PROVIDER NOTES
History     Chief Complaint:  Dental Problem    HPI   Joaquim Martinez is a 83 year old male on Coumadin for atrial fibrillation who presents to the emergency department today for evaluation of bleeding from tooth extraction site. The patient reports he had tooth #6 extracted one week ago with  at [facility] due to a fractured tooth. Per chart review, the patient was recently seen in the ED on 7/27 for similar bleeding from his tooth extraction site. He had stopped his Coumadin prior to the surgery, but did restart it and had his INR checked that was 3.27 at that time. Additionally, he his hemoglobin was 11.5 at that time. They applied cyklokapron to the extraction site and did not have any subsequent bleeding. He was told to hold on his coumadin and to follow up with his dentist. On 7/30 he followed up with his [facility] nurse who restarted him on his Coumadin as he did not have any more bleeding. Recently, he is now experiencing bleeding from the extraction site and pain in the area. He was concerned about the persistent bleeding prompting his visit to the emergency department. He denies light headedness, dizziness, and other medical concerns.     Allergies:  Calcium Channel Blockers    Medications:    calcium-vitamin D (CALTRATE) 600-400 MG-UNIT per tablet  Carvedilol Phosphate (COREG CR PO)  Furosemide (LASIX PO)  Losartan Potassium (COZAAR PO)  potassium chloride rowena er (K-DUR)  WARFARIN SODIUM PO    Past Medical History:    Atrial fibrillation  Cancer  Hypertension  Pacemaker    Past Surgical History:    Cardiac surgery-pacemaker  ENT surgery-tonsillectomy    Family History:    History reviewed. No pertinent family history.     Social History:  The patient was alone.  Smoking Status: Never  Smokeless Tobacco: Never  Alcohol Use: Yes, 1 glass of wine/day  Marital Status:      Review of Systems   HENT: Positive for dental problem.    Neurological: Negative for dizziness and light-headedness.  "  Hematological: Bruises/bleeds easily.   All other systems reviewed and are negative.    Physical Exam     Patient Vitals for the past 24 hrs:   BP Temp Temp src Heart Rate Resp SpO2 Height Weight   08/02/18 0233 155/77 98.5  F (36.9  C) Oral 60 16 100 % 1.803 m (5' 11\") 81.6 kg (180 lb)         Physical Exam  General: No distress.   Head: No signs of trauma.   Mouth/Throat: Oropharynx moist. Bleeding coming from the extraction site of tooth #6  Eyes: Conjunctivae are normal. Pupils are equal..   Neck: Normal range of motion.    Resp:No respiratory distress.   MSK: Normal range of motion. No obvious deformity.  Neuro: The patient is alert and interactive. BANSAL. Speech normal. GCS 15  Skin: No lesion or sign of trauma noted.   Psych: normal mood and affect. behavior is normal.     Emergency Department Course   Procedures:  The extraction site was injected with lidocaine with epinephrine. I applied silver nitrate over the surface of the extraction site. The patient tolerated the procedure well and there were no complications.     Emergency Department Course:  Nursing notes and vitals reviewed.  0240: I performed an exam of the patient as documented above.   0245: I numbed the patient's tooth as noted above.   0315: Patient rechecked and updated.   Findings and plan explained to the Patient. Patient discharged home with instructions regarding supportive care, medications, and reasons to return. The importance of close follow-up was reviewed.  I personally answered all related questions with the patient prior to discharge.    Impression & Plan    Medical Decision Making:  Joaquim Martinez is a 83 year old male who presents for evaluation of dental bleeding after tooth extraction. The bleeding was controlled with interventions in the ED and therefore supportive outpatient management is indicated. The extraction site was injected with lidocaine with epinephrine and the I applied a small amount of silver nitrate over the " surface of the site. Close follow-up with primary care in the next 1-2 days. There are no signs of coagulopathy causing the bleeding or a general medical condition (thrombocytopenia, DIC, leukemia, etc) causing the bleeding today. The extraction site  was packed with soaked gauze per the procedure note above. The bleeding stopped and did not restart here in ED. Will return immediately if return of bleeding, notices strange bruising or bleeding gums, or for other concerns.    Diagnosis:    ICD-10-CM    1. Surgical wound hemorrhage after dental procedure K91.840        Disposition:  discharged to home    Scribe Disclosure:  Gus MANUEL, am serving as a scribe at 2:35 AM on 8/2/2018 to document services personally performed by Maxim Kate MD based on my observations and the provider's statements to me.     8/2/2018    EMERGENCY DEPARTMENT       Maxim Kate MD  08/02/18 0448

## 2018-12-22 ENCOUNTER — HOSPITAL ENCOUNTER (EMERGENCY)
Facility: CLINIC | Age: 83
Discharge: HOME | End: 2018-12-22
Attending: EMERGENCY MEDICINE | Admitting: EMERGENCY MEDICINE
Payer: COMMERCIAL

## 2018-12-22 VITALS
HEART RATE: 56 BPM | TEMPERATURE: 97.9 F | DIASTOLIC BLOOD PRESSURE: 50 MMHG | BODY MASS INDEX: 25.9 KG/M2 | RESPIRATION RATE: 14 BRPM | SYSTOLIC BLOOD PRESSURE: 144 MMHG | WEIGHT: 185 LBS | HEIGHT: 71 IN | OXYGEN SATURATION: 100 %

## 2018-12-22 DIAGNOSIS — J02.9 VIRAL PHARYNGITIS: ICD-10-CM

## 2018-12-22 LAB
DEPRECATED S PYO AG THROAT QL EIA: NORMAL
SPECIMEN SOURCE: NORMAL

## 2018-12-22 PROCEDURE — 87081 CULTURE SCREEN ONLY: CPT | Performed by: EMERGENCY MEDICINE

## 2018-12-22 PROCEDURE — 99283 EMERGENCY DEPT VISIT LOW MDM: CPT

## 2018-12-22 PROCEDURE — 87880 STREP A ASSAY W/OPTIC: CPT | Performed by: EMERGENCY MEDICINE

## 2018-12-22 RX ORDER — DEXTROMETHORPHAN HBR. AND GUAIFENESIN 10; 100 MG/5ML; MG/5ML
10 SOLUTION ORAL 4 TIMES DAILY PRN
Qty: 150 ML | Refills: 0 | Status: SHIPPED | OUTPATIENT
Start: 2018-12-22 | End: 2019-01-21

## 2018-12-22 ASSESSMENT — ENCOUNTER SYMPTOMS
NECK PAIN: 0
COUGH: 1
FEVER: 0
SORE THROAT: 1

## 2018-12-22 ASSESSMENT — MIFFLIN-ST. JEOR: SCORE: 1551.28

## 2018-12-22 NOTE — ED AVS SNAPSHOT
Emergency Department  [address] [facility] [address]  Phone:  [phone]  Fax:  [phone]                                    Joaquim Martinez   MRN: [id number]    Department:   Emergency Department   Date of Visit:  12/22/2018           After Visit Summary Signature Page    I have received my discharge instructions, and my questions have been answered. I have discussed any challenges I see with this plan with the nurse or doctor.    ..........................................................................................................................................  Patient/Patient Representative Signature      ..........................................................................................................................................  Patient Representative Print Name and Relationship to Patient    ..................................................               ................................................  Date                                   Time    ..........................................................................................................................................  Reviewed by Signature/Title    ...................................................              ..............................................  Date                                               Time          22EPIC Rev 08/18

## 2018-12-22 NOTE — ED PROVIDER NOTES
"  History     Chief Complaint:  Pharyngitis     HPI   Joaquim Martinez is a 84 year old male with a history of Atrial Fibrillation on Coumadin s/p pacemaker placement, prostate cancer s/p radiation therapy, hypertension, cardiomyopathy, and PAD who presents to the emergency department for evaluation of pharyngitis. The patient reports that he has had a sore throat for three days, but it worsened yesterday--at that time it was \"burning.\" In addition to this, he has had some post-nasal drip, and his wife feels that he is fatigued and has less energy. Moreover, today he has a new cough, though this is minimal per patient. The patient denies fever and neck pain. For his symptoms he has been drinking hot tea.     Allergies:  Calcium Channel Blockers    Pneumoc 13-Betzy Conj-Dip Cr(Pf)    Medications:    Furosemide   Losartan  Potassium chloride rowena er   Warfarin sodium  Carvedilol   Zolpidem     Past Medical History:    Atrial Fibrillation   Prostate Cancer s/p radiation therapy   Hypertension  Pacemaker   Cardiomyopathy   Hyperlipidemia    Peripheral artery disease  Carotid bruit   SNHL of both ears   Raynaud's disease     Past Surgical History:    Pacemaker placement  Tonsillectomy   AV node ablation     Family History:    History reviewed. No pertinent family history.      Social History:  The patient was accompanied to the ED by his wife.  Smoking Status: Never  Smokeless Tobacco: Never  Alcohol Use: Yes  Marital Status:        Review of Systems   Constitutional: Negative for fever.   HENT: Positive for postnasal drip and sore throat.    Respiratory: Positive for cough.    Musculoskeletal: Negative for neck pain.   All other systems reviewed and are negative.    Physical Exam     Patient Vitals for the past 24 hrs:   BP Temp Temp src Pulse Resp SpO2 Height Weight   12/22/18 1106 144/50 97.9  F (36.6  C) Oral 56 14 100 % 1.803 m (5' 11\") 83.9 kg (185 lb)      Physical Exam  Vitals: reviewed by me  General: Pt " seen on hospital [facility], [facility], cooperative, and alert to conversation  Eyes: Tracking well, clear conjunctiva BL  ENT: MMM, midline trachea.  Minimal erythema noted to posterior oropharynx, full range of motion to neck, no evidence of RPA, PTA, no fluctuant masses felt.  No exudate.  Lungs:   No tachypnea, no accessory muscle use. No respiratory distress.   CV: Rate as above, regular rhythm.    Abd: Soft, non tender, no guarding, no rebound. Non distended  MSK: no peripheral edema or joint effusion.  No evidence of trauma  Skin: No rash, normal turgor and temperature  Neuro: Clear speech and no facial droop.  Psych: Not RIS, no e/o AH/VH      Emergency Department Course     Laboratory:  Laboratory findings were communicated with the patient and family who voiced understanding of the findings.    Rapid strep screen: negative    Beta strep group A culture: pending     Emergency Department Course:  Nursing notes and vitals reviewed.  The patient's throat was swabbed and this sample was sent for rapid strep screen, findings above.     1131: I performed an exam of the patient as documented above.     Findings and plan explained to the Patient and spouse. Patient discharged home with instructions regarding supportive care, medications, and reasons to return. The importance of close follow-up was reviewed. The patient was prescribed Tussin DM   I personally reviewed the laboratory results with the Patient and spouse and answered all related questions prior to discharge.    Impression & Plan      Medical Decision Making:  Joaquim Martinez is a 84 year old male who presents to the emergency department today with what appears to be viral pharyngitis. I believe that this diagnosis is supported yb erythema on exam, lack of fever, normal rapid strep test, no abscess, and normal vital signs. He has minimal disease burden overall and states he is able to do all ADL's. No other complaints. No evidence of RPA, PTA or viral  syndrome requiring treatment. Otherwise, appears quite healthy. Will discharge as above.       Diagnosis:    ICD-10-CM   1. Viral pharyngitis J02.9       Disposition:  Discharged to home.     Discharge Medications:  dextromethorphan-guaiFENesin  MG/5ML liquid  Commonly known as:  TUSSIN DM  10 mLs, Oral, 4 TIMES DAILY PRN         Scribe Disclosure:  I, Althea Norris, am serving as a scribe at 12:39 PM on 12/22/2018 to document services personally performed by Cornelius Doan MD based on my observations and the provider's statements to me.   12/22/2018    EMERGENCY DEPARTMENT       Isaac Doan MD  12/23/18 2890

## 2018-12-24 LAB
BACTERIA SPEC CULT: NORMAL
Lab: NORMAL
SPECIMEN SOURCE: NORMAL

## 2018-12-24 NOTE — RESULT ENCOUNTER NOTE
Final Beta strep group A r/o culture is NEGATIVE for Group A streptococcus.    No treatment or change in treatment per [place] Strep protocol.

## 2019-07-02 NOTE — ED AVS SNAPSHOT
Emergency Department    [address] [facility] [address]    Phone:  [phone]    Fax:  [phone]                                       Joaquim Martinez   MRN: [id number]    Department:   Emergency Department   Date of Visit:  7/27/2018           After Visit Summary Signature Page     I have received my discharge instructions, and my questions have been answered. I have discussed any challenges I see with this plan with the nurse or doctor.    ..........................................................................................................................................  Patient/Patient Representative Signature      ..........................................................................................................................................  Patient Representative Print Name and Relationship to Patient    ..................................................               ................................................  Date                                            Time    ..........................................................................................................................................  Reviewed by Signature/Title    ...................................................              ..............................................  Date                                                            Time           Detail Level: Detailed Quality 130: Documentation Of Current Medications In The Medical Record: Current Medications Documented Quality 131: Pain Assessment And Follow-Up: Pain assessment documented as positive using a standardized tool AND a follow-up plan is documented Quality 110: Preventive Care And Screening: Influenza Immunization: Influenza Immunization Administered during Influenza season Quality 474: Zoster Vaccination Status: Shingrix Vaccination Administered or Previously Received Quality 111:Pneumonia Vaccination Status For Older Adults: Pneumococcal Vaccination Previously Received